# Patient Record
Sex: FEMALE
[De-identification: names, ages, dates, MRNs, and addresses within clinical notes are randomized per-mention and may not be internally consistent; named-entity substitution may affect disease eponyms.]

---

## 2023-12-07 ENCOUNTER — DASHBOARD ENCOUNTERS (OUTPATIENT)
Age: 33
End: 2023-12-07

## 2023-12-11 PROBLEM — 422587007: Status: ACTIVE | Noted: 2023-12-11

## 2023-12-11 PROBLEM — 102614006: Status: ACTIVE | Noted: 2023-12-11

## 2023-12-12 ENCOUNTER — TELEPHONE ENCOUNTER (OUTPATIENT)
Dept: URBAN - METROPOLITAN AREA CLINIC 7 | Facility: CLINIC | Age: 33
End: 2023-12-12

## 2023-12-12 ENCOUNTER — OFFICE VISIT (OUTPATIENT)
Dept: URBAN - METROPOLITAN AREA CLINIC 7 | Facility: CLINIC | Age: 33
End: 2023-12-12

## 2023-12-12 RX ORDER — METFORMIN HYDROCHLORIDE 500 MG/1
TAKE 1 TABLET BY MOUTH TWICE DAILY WITH MEALS TABLET ORAL
Qty: 180 EACH | Refills: 1 | Status: ACTIVE | COMMUNITY

## 2023-12-12 RX ORDER — FAMOTIDINE 20 MG/1
TAKE 1 TABLET BY MOUTH TWICE DAILY TABLET, FILM COATED ORAL
Qty: 60 EACH | Refills: 0 | Status: ACTIVE | COMMUNITY

## 2023-12-12 RX ORDER — METHOCARBAMOL 500 MG/1
TAKE 2 TABLETS BY MOUTH THREE TIMES DAILY AS NEEDED FOR PAIN TABLET ORAL
Qty: 90 EACH | Refills: 0 | Status: ACTIVE | COMMUNITY

## 2023-12-12 RX ORDER — MEDROXYPROGESTERONE ACETATE 10 MG/1
TAKE 1 TABLET BY MOUTH DAILY TABLET ORAL
Qty: 10 EACH | Refills: 1 | Status: ACTIVE | COMMUNITY

## 2023-12-12 RX ORDER — LETROZOLE 2.5 MG/1
TAKE 2 TABLETS BY MOUTH DAILY TABLET, FILM COATED ORAL
Qty: 10 EACH | Refills: 1 | Status: ACTIVE | COMMUNITY

## 2023-12-12 NOTE — HPI-TODAY'S VISIT:
Patient is new to the practice and being evaluated for abdominal pain, nausea. Hx of diabetes, and PCOS.

## 2024-01-24 ENCOUNTER — OFFICE VISIT (OUTPATIENT)
Dept: URBAN - METROPOLITAN AREA CLINIC 63 | Facility: CLINIC | Age: 34
End: 2024-01-24

## 2024-06-03 ENCOUNTER — OFFICE VISIT (OUTPATIENT)
Dept: URBAN - METROPOLITAN AREA CLINIC 7 | Facility: CLINIC | Age: 34
End: 2024-06-03
Payer: SELF-PAY

## 2024-06-03 VITALS
WEIGHT: 153 LBS | TEMPERATURE: 97.8 F | RESPIRATION RATE: 16 BRPM | BODY MASS INDEX: 27.11 KG/M2 | HEIGHT: 63 IN | DIASTOLIC BLOOD PRESSURE: 70 MMHG | SYSTOLIC BLOOD PRESSURE: 120 MMHG

## 2024-06-03 DIAGNOSIS — Z87.42 HISTORY OF PCOS: ICD-10-CM

## 2024-06-03 DIAGNOSIS — K58.0 IRRITABLE BOWEL SYNDROME WITH DIARRHEA: ICD-10-CM

## 2024-06-03 DIAGNOSIS — R14.0 ABDOMINAL BLOATING: ICD-10-CM

## 2024-06-03 DIAGNOSIS — R10.13 DYSPEPSIA: ICD-10-CM

## 2024-06-03 PROBLEM — 116289008: Status: ACTIVE | Noted: 2024-06-03

## 2024-06-03 PROBLEM — 162031009: Status: ACTIVE | Noted: 2024-06-03

## 2024-06-03 PROBLEM — 197125005: Status: ACTIVE | Noted: 2024-06-03

## 2024-06-03 PROCEDURE — 99203 OFFICE O/P NEW LOW 30 MIN: CPT | Performed by: INTERNAL MEDICINE

## 2024-06-03 RX ORDER — LETROZOLE 2.5 MG/1
TAKE 2 TABLETS BY MOUTH DAILY TABLET, FILM COATED ORAL
Qty: 10 EACH | Refills: 1 | Status: DISCONTINUED | COMMUNITY

## 2024-06-03 RX ORDER — FAMOTIDINE 20 MG/1
TAKE 1 TABLET BY MOUTH TWICE DAILY TABLET, FILM COATED ORAL
Qty: 60 EACH | Refills: 0 | Status: DISCONTINUED | COMMUNITY

## 2024-06-03 RX ORDER — MEDROXYPROGESTERONE ACETATE 10 MG/1
TAKE 1 TABLET BY MOUTH DAILY TABLET ORAL
Qty: 10 EACH | Refills: 1 | Status: DISCONTINUED | COMMUNITY

## 2024-06-03 RX ORDER — METRONIDAZOLE 250 MG/1
1 TABLET TABLET ORAL THREE TIMES A DAY
Qty: 30 | Refills: 0 | OUTPATIENT
Start: 2024-06-03 | End: 2024-06-13

## 2024-06-03 RX ORDER — METHOCARBAMOL 500 MG/1
TAKE 2 TABLETS BY MOUTH THREE TIMES DAILY AS NEEDED FOR PAIN TABLET ORAL
Qty: 90 EACH | Refills: 0 | Status: DISCONTINUED | COMMUNITY

## 2024-06-03 RX ORDER — METFORMIN HYDROCHLORIDE 500 MG/1
TAKE 1 TABLET BY MOUTH TWICE DAILY WITH MEALS TABLET ORAL
Qty: 180 EACH | Refills: 1 | Status: DISCONTINUED | COMMUNITY

## 2024-06-03 NOTE — HPI-TODAY'S VISIT:
Patient is new to the practice. Hx of diabetes and PCOS. Recent ALT 34, Hgb A1C 5.2%. Fam hx of CRC. Having post-prandial diarrheal symptoms, happening for some time, longer history of bowel symptoms, gastritis. Mother had polyps, grandmother had colorectal cancer. She use to be more constipated, and now going more frequently, more liquid. Had HP testing which was negative.

## 2024-06-04 ENCOUNTER — TELEPHONE ENCOUNTER (OUTPATIENT)
Dept: URBAN - METROPOLITAN AREA CLINIC 7 | Facility: CLINIC | Age: 34
End: 2024-06-04

## 2024-06-28 ENCOUNTER — TELEPHONE ENCOUNTER (OUTPATIENT)
Dept: URBAN - METROPOLITAN AREA CLINIC 7 | Facility: CLINIC | Age: 34
End: 2024-06-28

## 2024-07-19 ENCOUNTER — OFFICE VISIT (OUTPATIENT)
Dept: URBAN - METROPOLITAN AREA CLINIC 7 | Facility: CLINIC | Age: 34
End: 2024-07-19

## 2024-07-29 ENCOUNTER — TELEPHONE ENCOUNTER (OUTPATIENT)
Dept: URBAN - METROPOLITAN AREA CLINIC 7 | Facility: CLINIC | Age: 34
End: 2024-07-29